# Patient Record
Sex: FEMALE | Race: AMERICAN INDIAN OR ALASKA NATIVE
[De-identification: names, ages, dates, MRNs, and addresses within clinical notes are randomized per-mention and may not be internally consistent; named-entity substitution may affect disease eponyms.]

---

## 2018-03-16 ENCOUNTER — HOSPITAL ENCOUNTER (OUTPATIENT)
Dept: HOSPITAL 5 - SPVWC | Age: 68
Discharge: HOME | End: 2018-03-16
Attending: OBSTETRICS & GYNECOLOGY
Payer: MEDICARE

## 2018-03-16 DIAGNOSIS — Z12.31: Primary | ICD-10-CM

## 2018-03-16 PROCEDURE — 77067 SCR MAMMO BI INCL CAD: CPT

## 2018-03-16 NOTE — MAMMOGRAPHY REPORT
BILATERAL MAMMOGRAM:



FINDINGS:

The breast tissue is heterogeneously dense, which could obscure

detection of small masses (approximately 50%-75% glandular).  No mass, 

distortion, suspicious calcification, or skin change is seen. No 

interval change when compared to prior exams in 2016 and 2017.



CAD was utilized.



IMPRESSION:

Negative mammogram.  There is no mammographic evidence of

malignancy.



RECOMMENDATION:

Follow-up per ACS guidelines.



BI-RADS CATEGORY:  1 = Negative



ACR BI-RADS MAMMOGRAPHIC CODES:

0 = Needs additional imaging evaluation; 1 = Negative; 2 = Benign; 3 = 

Probably benign; 4 = Suspicious; 5 = Malignant; 6 = Known biopsy-proven 

malignancy



COMMENT:

      1.   Dense breast tissue, i.e., adenosis, fibrocystic 

            changes, etc., may obscure an underlying neoplasm.

      2.   Approximately 10% of cancers are not detected with

            mammography.

      3.   A negative mammography report should not delay biopsy 

            if a clinically suspicious mass is present.



COMMENT:

Patient follow-up letters are generated in [employer].

## 2019-08-12 NOTE — MAMMOGRAPHY REPORT
DIGITAL SCREENING MAMMOGRAM WITH CAD, 8/9/2019



INDICATION: Routine screening mammography. 



TECHNIQUE:  Digital bilateral  2D mammography was obtained in the craniocaudal and mediolateral obliq
ue projections. This examination was interpreted with the benefit of Computer-Aided Detection analysi
s.



COMPARISON: 4/16/2015



FINDINGS: 



Breast Density: The breasts are heterogeneously dense, which may obscure small masses.



There is no evidence of dominant mass, suspicious calcifications or architectural distortion in eithe
r breast.



IMPRESSION:



BI-RADS Category 1:  Negative. No mammographic evidence of malignancy.  Recommend routine screening m
ammography in one year.



A "normal" or negative report should not discourage follow up or biopsy of a clinically significant f
inding.



A written summary of these findings will be mailed to the patient. The patient will be entered into a
 mammography reporting system which will generate a reminder letter for the patient's next appointmen
t at the appropriate interval.



The American College of Radiology recommends yearly mammograms starting at age 40 and continuing as l
criselda as a woman is in good health.  Breast MRI is recommended for women with an approximate 20-25% or 
greater lifetime risk of breast cancer, including women with a strong family history of breast or ova
wendy cancer or who have been treated for Hodgkin's disease.



Signer Name: Grupo Mueller MD 

Signed: 8/12/2019 9:25 AM

 Workstation Name: [id number]

## 2020-08-11 NOTE — MAMMOGRAPHY REPORT
BILATERAL DIGITAL SCREENING MAMMOGRAM WITH CAD 

 

HISTORY: SCREENING MAMMOGRAM



TECHNIQUE:  Routine digital mammographic imaging performed.  This examination was interpreted with marysol boyer benefit of Computer-aided Detection analysis.



COMPARISON: 8/9/2019, 3/16/2018, 12/12/2016.



FINDINGS: 



Breast Density: heterogeneously dense breast parenchymal pattern which somewhat lessens the sensitivi
ty of the evaluation.



Digital CC and MLO views demonstrate no mammographic evidence of malignancy.





IMPRESSION:



No mammographic evidence of malignancy.  If the clinical examination remains stable, recommend bilate
ral mammogram in approximately one year.



BIRADS 1:  Negative.





FURTHER INFORMATION:  According to the American College of Radiology, yearly mammograms are recommend
ed starting at age 40 and continuing as long as a woman is in good health. Clinical Breast Exams shou
ld be part of a periodic health exam-about every 3 years for women in their 20s and 30s and every yea
r for women 40 and over. Breast self exam is an option for women starting in their 20s. Any breast ch
monalisa noted on a breast self exam should be reported promptly to the patient's healthcare provider. Br
east MRI is recommended for women with an approximately 20-25% or greater lifetime risk of breast can
cer, including women with a strong family history of breast or ovarian cancer and women who have been
 treated for Hodgkin's disease.



A negative Mammography report should not discourage follow up or biopsy of a clinically significant f
inding and/or abnormality.



Dense breast tissue may obscure small neoplasms.  



The patient will be entered into a reminder system with a target due date for the next screening mamm
ogram.

 A



Signer Name: Johnathon Garcia MD 

Signed: 8/11/2020 1:10 PM

Workstation Name: [id number]

## 2023-10-04 NOTE — MAMMOGRAPHY REPORT
DIGITAL SCREENING MAMMOGRAM WITH CAD, 10/23/2021



CLINICAL INFORMATION / INDICATION: Routine screening mammography. SCREENING MAMMOGRAM



TECHNIQUE:  Digital bilateral 2D mammography was obtained in the craniocaudal and mediolateral obliqu
e projections. This examination was interpreted with the benefit of Computer-Aided Detection analysis
.



COMPARISON: 8/11/2020, 8/9/2019.



FINDINGS: 



Breast Density: The breasts are heterogeneously dense, which may obscure small masses.



No dominant mass, suspicious calcifications, or architectural distortion in either breast. 





 

IMPRESSION: No mammographic evidence of malignancy.



Follow up recommendation: Routine yearly



BI-RADS Category 1:  Negative.





-------------------------------------------------------------------------------------------

A "normal" or negative report should not discourage follow up or biopsy of a clinically significant f
inding.



A written summary of these findings will be mailed to the patient. The patient will be entered into a
 mammography reporting system which will generate a reminder letter for the patient's next appointmen
t at the appropriate interval.



The American College of Radiology recommends yearly mammograms starting at age 40 and continuing as l
criselda as a woman is in good health.  Breast MRI is recommended for women with an approximate 20-25% or 
greater lifetime risk of breast cancer, including women with a strong family history of breast or ova
wendy cancer or who have been treated for Hodgkin's disease.



Signer Name: Murphy Astorga MD 

Signed: 10/23/2021 12:21 PM

Workstation Name: [id number]